# Patient Record
Sex: FEMALE | Race: WHITE
[De-identification: names, ages, dates, MRNs, and addresses within clinical notes are randomized per-mention and may not be internally consistent; named-entity substitution may affect disease eponyms.]

---

## 2019-04-29 ENCOUNTER — HOSPITAL ENCOUNTER (EMERGENCY)
Dept: HOSPITAL 88 - FSED | Age: 24
Discharge: HOME | End: 2019-04-29
Payer: SELF-PAY

## 2019-04-29 VITALS — WEIGHT: 91.5 LBS | HEIGHT: 65 IN | BODY MASS INDEX: 15.24 KG/M2

## 2019-04-29 DIAGNOSIS — S89.91XA: Primary | ICD-10-CM

## 2019-04-29 DIAGNOSIS — Z87.891: ICD-10-CM
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
## 2019-04-29 NOTE — XMS REPORT
Patient Summary Document

                             Created on: 2019



CHIRAG CARUSO

External Reference #: [id number]

: 1995

Sex: Female



Demographics







                          Address                   [address] [place]  [address]

 

                          Home Phone                [phone]

 

                          Preferred Language        Unknown

 

                          Marital Status            Unknown

 

                          Faith Affiliation     Unknown

 

                          Race                      Unknown

 

                          Ethnic Group              Unknown





Author







                          Author                    [facility]

 

                          Address                   Unknown

 

                          Phone                     Unavailable







Support







                Name            Relationship    Address         Phone

 

                    MARISOL MARIE    PRS                 [address] [place]  [address] [phone]

 

                    MARISOL MARIE    PRS                 [address], [place]  [id number] [phone]







Care Team Providers







                    Care Team Member Name    Role                Phone

 

                          Unavailable               Unavailable







Payers







             Payer Name    Policy Type    Policy Number    Effective Date    Expiration Date







Problems

This patient has no known problems.



Allergies, Adverse Reactions, Alerts







          Allergy Name    Allergy Type    Status    Severity    Reaction(s)    Onset Date    Inactive 

Date                      Treating Clinician        Comments

 

        pumpkin    DA      Active    MI              2018 00:00:00                     

 

        pumpkin    DA      Active    MI              2018 00:00:00                     







Medications

This patient has no known medications.

## 2019-04-29 NOTE — DIAGNOSTIC IMAGING REPORT
Exam: Right knee series; 3 views dated 4/29/2019



History: Blunt trauma to the right knee



Comparison: None available



Findings:  Bones are normally mineralized. No fracture or dislocation is

identified. The soft tissues appear intact.





Impression:



No acute bony abnormality.



Signed by: Dr. Theo Cooper DO on 4/29/2019 3:47 PM

## 2019-04-29 NOTE — XMS REPORT
Clinical Summary

                             Created on: 2019



Lis Witt

External Reference #: [id number]

: 1995

Sex: Female



Demographics







                          Address                   [address] [place]  [address]

 

                          Home Phone                [phone]

 

                          Preferred Language        English

 

                          Marital Status            Single

 

                          Mandaeism Affiliation     [id number]

 

                          Race                      White

 

                          Ethnic Group              /Latin





Author







                          Author                    [facility]

 

                          Organization              [facility]

 

                          Address                   Unknown

 

                          Phone                     Unavailable







Support







                Name            Relationship    Address         Phone

 

                    Shania Witt         [employer]                [address] [place]  [address]                     [phone]







Care Team Providers







                    Care Team Member Name    Role                Phone

 

                    Estuardo Johnson MD    PCP                 [phone]







Allergies

No Known Allergies



Medications







                          End Date                  Status



              Medication     Sig          Dispensed     Refills      Start  



                                         Date  

 

                                                    Active



                     ciprofloxacin (CIPRO) 500     Take 500 mg         0   



                           MG tablet                 by mouth 2     



                                         (two) times a     



                                         day.     







Active Problems





Not on file



Social History







                                        Date



                 Tobacco Use     Types           Packs/Day       Years Used 

 

                                         



                                         Never Smoker    









   



                 Alcohol Use     Drinks/Week     oz/Week         Comments

 

   



                                         No   









 



                           Sex Assigned at Birth     Date Recorded

 

 



                                         Not on file 









                                        Industry



                           Job Start Date            Occupation 

 

                                        Not on file



                           Not on file               Not on file 









                                        Travel End



                           Travel History            Travel Start 

 





                                         No recent travel history available.







Last Filed Vital Signs

Not on file



Plan of Treatment





Not on file



Results

Not on fileafter 2018



Advance Directives





Patient has advance care planning documents on file. For more information, caprice perea contact:



[facility]ose[facility] Nix



[address] [place] [address]

## 2022-08-17 ENCOUNTER — HOSPITAL ENCOUNTER (EMERGENCY)
Dept: HOSPITAL 88 - FSED | Age: 27
Discharge: HOME | End: 2022-08-17
Payer: COMMERCIAL

## 2022-08-17 VITALS — HEIGHT: 65 IN | BODY MASS INDEX: 17.49 KG/M2 | WEIGHT: 105 LBS

## 2022-08-17 DIAGNOSIS — G40.909: ICD-10-CM

## 2022-08-17 DIAGNOSIS — S02.5XXA: ICD-10-CM

## 2022-08-17 DIAGNOSIS — K08.89: Primary | ICD-10-CM

## 2022-08-17 PROCEDURE — 99283 EMERGENCY DEPT VISIT LOW MDM: CPT

## 2023-05-03 ENCOUNTER — HOSPITAL ENCOUNTER (EMERGENCY)
Dept: HOSPITAL 88 - FSED | Age: 28
Discharge: HOME | End: 2023-05-03
Payer: COMMERCIAL
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
## 2023-05-03 DIAGNOSIS — S00.83XA: Primary | ICD-10-CM
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
## 2023-05-03 DIAGNOSIS — M54.2: ICD-10-CM

## 2023-05-03 DIAGNOSIS — S33.5XXA: ICD-10-CM

## 2023-05-03 DIAGNOSIS — R51.9: ICD-10-CM

## 2023-05-03 PROCEDURE — 70450 CT HEAD/BRAIN W/O DYE: CPT

## 2023-05-03 PROCEDURE — 99283 EMERGENCY DEPT VISIT LOW MDM: CPT

## 2023-05-03 PROCEDURE — 72040 X-RAY EXAM NECK SPINE 2-3 VW: CPT

## 2023-05-03 PROCEDURE — 72100 X-RAY EXAM L-S SPINE 2/3 VWS: CPT
